# Patient Record
Sex: FEMALE | Race: WHITE | NOT HISPANIC OR LATINO | URBAN - METROPOLITAN AREA
[De-identification: names, ages, dates, MRNs, and addresses within clinical notes are randomized per-mention and may not be internally consistent; named-entity substitution may affect disease eponyms.]

---

## 2017-06-08 ENCOUNTER — OFFICE (OUTPATIENT)
Dept: URBAN - METROPOLITAN AREA CLINIC 12 | Facility: CLINIC | Age: 47
End: 2017-06-08

## 2017-06-08 VITALS
HEART RATE: 78 BPM | WEIGHT: 167 LBS | HEIGHT: 66 IN | SYSTOLIC BLOOD PRESSURE: 122 MMHG | DIASTOLIC BLOOD PRESSURE: 82 MMHG

## 2017-06-08 DIAGNOSIS — F17.200 NICOTINE DEPENDENCE, UNSPECIFIED, UNCOMPLICATED: ICD-10-CM

## 2017-06-08 DIAGNOSIS — R19.7 DIARRHEA, UNSPECIFIED: ICD-10-CM

## 2017-06-08 DIAGNOSIS — K50.90 CROHN'S DISEASE, UNSPECIFIED, WITHOUT COMPLICATIONS: ICD-10-CM

## 2017-06-08 PROCEDURE — 36415 COLL VENOUS BLD VENIPUNCTURE: CPT | Performed by: INTERNAL MEDICINE

## 2017-06-08 PROCEDURE — 99203 OFFICE O/P NEW LOW 30 MIN: CPT | Performed by: INTERNAL MEDICINE

## 2017-06-08 NOTE — SERVICEHPINOTES
46 y first visit after 10 years . 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 She says was seen by Abbi CASAS - doesn't remember the name . had a colonoscopy and was told that she had CD. She is on no Rx and has never had been (as she thought the medication will cause diarrhea). No blood in stool. Loose watery stool 3-5 a day every day. No abdominal pain except mild cramping that she gets in the beginning of her menstrual cycles . Otherwise no abdominal pain. Mother with 'colitis'. Recently on 5/22/17 went to [facility] with nausea / vomiting ( a week duration) and ongoing diarrhea. CT scan showed ileocolitis without any obstruction. WBC was normal , HB normal. Lipase 400. Was started on Lialda which she took 4 a day but then stopped taking 4-5 days back. But her sx significantly improved with that. Now no vomiting/nausea. Diarrhea as baseline.  +smokes e - cigrettes with nicotine

## 2017-06-08 NOTE — SERVICENOTES
GET last colon report - if cant then repeat colonoscopy . May need to get CT enterography but will hold off for now . Short term f/u in 4 weeks

## 2017-06-09 LAB — C-REACTIVE PROTEIN: <0.5 MG/DL

## 2017-06-19 LAB
CALPROTECTIN, FECAL: CALPROTECTIN,FECAL: <16 MCG/G
CLOSTRIDIUM DIFFICILE TOXIN: CD TOXIN: (no result)
FECAL LEUKOCYTES: STOOL WBC: (no result)
GIARDIA: (no result)
OVA & PARASITES: OP: (no result)
STOOL CULTURE: C STOOL: (no result)

## 2017-07-31 ENCOUNTER — OFFICE (OUTPATIENT)
Dept: URBAN - METROPOLITAN AREA CLINIC 11 | Facility: CLINIC | Age: 47
End: 2017-07-31

## 2017-07-31 ENCOUNTER — AMBULATORY SURGICAL CENTER (OUTPATIENT)
Dept: URBAN - METROPOLITAN AREA SURGERY 2 | Facility: SURGERY | Age: 47
End: 2017-07-31

## 2017-07-31 VITALS
HEART RATE: 72 BPM | HEART RATE: 77 BPM | OXYGEN SATURATION: 97 % | DIASTOLIC BLOOD PRESSURE: 77 MMHG | SYSTOLIC BLOOD PRESSURE: 141 MMHG | OXYGEN SATURATION: 96 % | OXYGEN SATURATION: 97 % | HEART RATE: 77 BPM | SYSTOLIC BLOOD PRESSURE: 115 MMHG | SYSTOLIC BLOOD PRESSURE: 121 MMHG | HEART RATE: 80 BPM | TEMPERATURE: 97.8 F | DIASTOLIC BLOOD PRESSURE: 74 MMHG | RESPIRATION RATE: 18 BRPM | SYSTOLIC BLOOD PRESSURE: 121 MMHG | SYSTOLIC BLOOD PRESSURE: 120 MMHG | DIASTOLIC BLOOD PRESSURE: 77 MMHG | OXYGEN SATURATION: 98 % | WEIGHT: 168 LBS | DIASTOLIC BLOOD PRESSURE: 76 MMHG | HEIGHT: 66 IN | OXYGEN SATURATION: 98 % | SYSTOLIC BLOOD PRESSURE: 141 MMHG | RESPIRATION RATE: 18 BRPM | DIASTOLIC BLOOD PRESSURE: 83 MMHG | HEART RATE: 80 BPM | DIASTOLIC BLOOD PRESSURE: 76 MMHG | OXYGEN SATURATION: 96 % | SYSTOLIC BLOOD PRESSURE: 120 MMHG | RESPIRATION RATE: 16 BRPM | RESPIRATION RATE: 16 BRPM | HEIGHT: 66 IN | WEIGHT: 168 LBS | DIASTOLIC BLOOD PRESSURE: 74 MMHG | TEMPERATURE: 97.8 F | SYSTOLIC BLOOD PRESSURE: 115 MMHG | HEART RATE: 72 BPM | DIASTOLIC BLOOD PRESSURE: 83 MMHG

## 2017-07-31 DIAGNOSIS — K64.8 OTHER HEMORRHOIDS: ICD-10-CM

## 2017-07-31 DIAGNOSIS — K50.90 CROHN'S DISEASE, UNSPECIFIED, WITHOUT COMPLICATIONS: ICD-10-CM

## 2017-07-31 PROBLEM — K52.89 INFLAMMATORY BOWEL DISEASE OF THE INTESTINE IF MORE PRECISE DIAGNOSIS OR DETERMINATION OF THE EXTENT/SEVERITY OF ACTIVITY OF DISEASE WILL INFLUENCE IMMEDIATE/FUTURE MANAGEMENT: Status: ACTIVE | Noted: 2017-07-31

## 2017-07-31 PROCEDURE — 88342 IMHCHEM/IMCYTCHM 1ST ANTB: CPT | Performed by: INTERNAL MEDICINE

## 2017-07-31 PROCEDURE — 88305 TISSUE EXAM BY PATHOLOGIST: CPT | Performed by: INTERNAL MEDICINE

## 2017-07-31 PROCEDURE — 45380 COLONOSCOPY AND BIOPSY: CPT | Performed by: INTERNAL MEDICINE

## 2017-07-31 RX ORDER — MESALAMINE 1.2 G/1
TABLET, DELAYED RELEASE ORAL
Qty: 168 | Refills: 0 | Status: COMPLETED
Start: 2017-07-31 | End: 2023-10-16

## 2019-07-16 ENCOUNTER — INPATIENT HOSPITAL (OUTPATIENT)
Dept: URBAN - METROPOLITAN AREA HOSPITAL 81 | Facility: HOSPITAL | Age: 49
End: 2019-07-16

## 2019-07-16 DIAGNOSIS — K50.012 CROHN'S DISEASE OF SMALL INTESTINE WITH INTESTINAL OBSTRUCTI: ICD-10-CM

## 2019-07-16 DIAGNOSIS — K56.600 PARTIAL INTESTINAL OBSTRUCTION, UNSPECIFIED AS TO CAUSE: ICD-10-CM

## 2019-07-16 PROCEDURE — 99254 IP/OBS CNSLTJ NEW/EST MOD 60: CPT | Performed by: INTERNAL MEDICINE

## 2019-07-17 ENCOUNTER — INPATIENT HOSPITAL (OUTPATIENT)
Dept: URBAN - METROPOLITAN AREA HOSPITAL 81 | Facility: HOSPITAL | Age: 49
End: 2019-07-17

## 2019-07-17 DIAGNOSIS — K50.012 CROHN'S DISEASE OF SMALL INTESTINE WITH INTESTINAL OBSTRUCTI: ICD-10-CM

## 2019-07-17 DIAGNOSIS — K56.600 PARTIAL INTESTINAL OBSTRUCTION, UNSPECIFIED AS TO CAUSE: ICD-10-CM

## 2019-07-17 PROCEDURE — 99232 SBSQ HOSP IP/OBS MODERATE 35: CPT | Performed by: INTERNAL MEDICINE

## 2023-10-16 ENCOUNTER — OFFICE (OUTPATIENT)
Dept: URBAN - METROPOLITAN AREA CLINIC 8 | Facility: CLINIC | Age: 53
End: 2023-10-16

## 2023-10-16 VITALS
HEIGHT: 66 IN | OXYGEN SATURATION: 95 % | SYSTOLIC BLOOD PRESSURE: 135 MMHG | HEART RATE: 94 BPM | DIASTOLIC BLOOD PRESSURE: 87 MMHG | WEIGHT: 193 LBS

## 2023-10-16 DIAGNOSIS — K52.9 NONINFECTIVE GASTROENTERITIS AND COLITIS, UNSPECIFIED: ICD-10-CM

## 2023-10-16 DIAGNOSIS — R74.01 ELEVATION OF LEVELS OF LIVER TRANSAMINASE LEVELS: ICD-10-CM

## 2023-10-16 PROCEDURE — 99204 OFFICE O/P NEW MOD 45 MIN: CPT | Performed by: INTERNAL MEDICINE

## 2023-10-16 RX ORDER — SODIUM PICOSULFATE, MAGNESIUM OXIDE, AND ANHYDROUS CITRIC ACID 10; 3.5; 12 MG/160ML; G/160ML; G/160ML
LIQUID ORAL
Qty: 350 | Refills: 0 | Status: ACTIVE
Start: 2023-10-16